# Patient Record
Sex: FEMALE | Race: WHITE | Employment: FULL TIME | URBAN - METROPOLITAN AREA
[De-identification: names, ages, dates, MRNs, and addresses within clinical notes are randomized per-mention and may not be internally consistent; named-entity substitution may affect disease eponyms.]

---

## 2018-05-28 ENCOUNTER — HOSPITAL ENCOUNTER (EMERGENCY)
Age: 33
Discharge: HOME | End: 2018-05-28
Payer: COMMERCIAL

## 2018-05-28 VITALS
DIASTOLIC BLOOD PRESSURE: 77 MMHG | OXYGEN SATURATION: 98 % | RESPIRATION RATE: 16 BRPM | HEART RATE: 75 BPM | BODY MASS INDEX: 33.32 KG/M2 | HEIGHT: 65 IN | WEIGHT: 200 LBS | TEMPERATURE: 98.5 F | SYSTOLIC BLOOD PRESSURE: 159 MMHG

## 2018-05-28 DIAGNOSIS — J03.90 ACUTE TONSILLITIS, UNSPECIFIED ETIOLOGY: Primary | ICD-10-CM

## 2018-05-28 LAB — STREP GRP A PCR: NEGATIVE

## 2018-05-28 PROCEDURE — 87880 STREP A ASSAY W/OPTIC: CPT

## 2018-05-28 PROCEDURE — 99283 EMERGENCY DEPT VISIT LOW MDM: CPT

## 2018-05-28 RX ORDER — AMOXICILLIN AND CLAVULANATE POTASSIUM 875; 125 MG/1; MG/1
1 TABLET, FILM COATED ORAL 2 TIMES DAILY
Qty: 20 TABLET | Refills: 0 | Status: SHIPPED | OUTPATIENT
Start: 2018-05-28 | End: 2018-06-07

## 2018-05-28 RX ORDER — METHYLPREDNISOLONE 4 MG/1
TABLET ORAL
Qty: 1 KIT | Refills: 0 | Status: SHIPPED | OUTPATIENT
Start: 2018-05-28 | End: 2018-06-03

## 2018-05-28 ASSESSMENT — PAIN DESCRIPTION - PAIN TYPE: TYPE: ACUTE PAIN

## 2018-05-28 ASSESSMENT — PAIN SCALES - GENERAL: PAINLEVEL_OUTOF10: 2

## 2018-05-28 ASSESSMENT — PAIN DESCRIPTION - LOCATION: LOCATION: THROAT

## 2018-05-28 ASSESSMENT — PAIN DESCRIPTION - DESCRIPTORS: DESCRIPTORS: SORE

## 2025-02-01 ENCOUNTER — OFFICE VISIT (OUTPATIENT)
Dept: FAMILY MEDICINE CLINIC | Age: 40
End: 2025-02-01
Payer: COMMERCIAL

## 2025-02-01 VITALS
BODY MASS INDEX: 38.49 KG/M2 | SYSTOLIC BLOOD PRESSURE: 112 MMHG | DIASTOLIC BLOOD PRESSURE: 82 MMHG | RESPIRATION RATE: 18 BRPM | HEART RATE: 93 BPM | TEMPERATURE: 98.4 F | OXYGEN SATURATION: 96 % | HEIGHT: 65 IN | WEIGHT: 231 LBS

## 2025-02-01 DIAGNOSIS — J06.9 VIRAL URI: ICD-10-CM

## 2025-02-01 DIAGNOSIS — J02.9 SORE THROAT: ICD-10-CM

## 2025-02-01 DIAGNOSIS — R52 BODY ACHES: ICD-10-CM

## 2025-02-01 DIAGNOSIS — R05.9 COUGH, UNSPECIFIED TYPE: Primary | ICD-10-CM

## 2025-02-01 DIAGNOSIS — R09.81 CONGESTION OF NASAL SINUS: ICD-10-CM

## 2025-02-01 LAB
INFLUENZA A ANTIGEN, POC: NORMAL
INFLUENZA B ANTIGEN, POC: NORMAL
Lab: NORMAL
PERFORMING INSTRUMENT: NORMAL
QC PASS/FAIL: NORMAL
S PYO AG THROAT QL: NORMAL
SARS-COV-2, POC: NORMAL

## 2025-02-01 PROCEDURE — 87426 SARSCOV CORONAVIRUS AG IA: CPT | Performed by: FAMILY MEDICINE

## 2025-02-01 PROCEDURE — 87804 INFLUENZA ASSAY W/OPTIC: CPT | Performed by: FAMILY MEDICINE

## 2025-02-01 PROCEDURE — 99213 OFFICE O/P EST LOW 20 MIN: CPT | Performed by: FAMILY MEDICINE

## 2025-02-01 PROCEDURE — 87880 STREP A ASSAY W/OPTIC: CPT | Performed by: FAMILY MEDICINE

## 2025-02-01 RX ORDER — BENZONATATE 100 MG/1
100 CAPSULE ORAL 3 TIMES DAILY PRN
Qty: 30 CAPSULE | Refills: 0 | Status: SHIPPED | OUTPATIENT
Start: 2025-02-01 | End: 2025-02-11

## 2025-02-01 RX ORDER — AZITHROMYCIN 250 MG/1
TABLET, FILM COATED ORAL
Qty: 6 TABLET | Refills: 0 | Status: SHIPPED | OUTPATIENT
Start: 2025-02-01 | End: 2025-02-11

## 2025-02-01 RX ORDER — GUAIFENESIN 600 MG/1
600 TABLET, EXTENDED RELEASE ORAL 2 TIMES DAILY
Qty: 30 TABLET | Refills: 0 | Status: SHIPPED | OUTPATIENT
Start: 2025-02-01 | End: 2025-02-16

## 2025-02-01 NOTE — PROGRESS NOTES
OFFICE NOTE    25  Name: Carrol Blas  :1985   Sex:female   Age:39 y.o.      SUBJECTIVE  Chief Complaint   Patient presents with    Cough    Congestion    Generalized Body Aches    Fatigue    Nasal Congestion    Sore Throat       HPI Symptomatic for several days    Review of Systems   Does not believe she has had a high fever, cough sometimes productive. Not overtly wheezing      Current Outpatient Medications:     azithromycin (ZITHROMAX) 250 MG tablet, 500mg on day 1 followed by 250mg on days 2 - 5, Disp: 6 tablet, Rfl: 0    benzonatate (TESSALON) 100 MG capsule, Take 1 capsule by mouth 3 times daily as needed for Cough, Disp: 30 capsule, Rfl: 0    guaiFENesin (MUCINEX) 600 MG extended release tablet, Take 1 tablet by mouth 2 times daily for 15 days, Disp: 30 tablet, Rfl: 0  No Known Allergies    No past medical history on file.  No past surgical history on file.  No family history on file.  Social History       Tobacco History       Smoking Status  Every Day Current Packs/Day  0.3 packs/day Smoking Tobacco Type  Cigarettes   Pack Year History     Packs/Day From To Years    0.25   0.0      Smokeless Tobacco Use  Never              Alcohol History       Alcohol Use Status  Yes Comment  Social              Drug Use       Drug Use Status  No              Sexual Activity       Sexually Active  Not Asked                    OBJECTIVE  Vitals:    25 0830   BP: 112/82   Site: Left Upper Arm   Position: Sitting   Cuff Size: Large Adult   Pulse: 93   Resp: 18   Temp: 98.4 °F (36.9 °C)   TempSrc: Temporal   SpO2: 96%   Weight: 104.8 kg (231 lb)   Height: 1.651 m (5' 5\")        Body mass index is 38.44 kg/m².    Orders Placed This Encounter   Procedures    POCT Influenza A/B Antigen    POCT rapid strep A    POCT COVID-19, Antigen        EXAM   Physical Exam  Vitals and nursing note reviewed.   Constitutional:       Appearance: Normal appearance. She is obese.   HENT:      Right Ear: Tympanic membrane

## 2025-04-18 ENCOUNTER — OFFICE VISIT (OUTPATIENT)
Dept: FAMILY MEDICINE CLINIC | Age: 40
End: 2025-04-18

## 2025-04-18 VITALS
DIASTOLIC BLOOD PRESSURE: 76 MMHG | WEIGHT: 238 LBS | RESPIRATION RATE: 18 BRPM | BODY MASS INDEX: 39.65 KG/M2 | HEIGHT: 65 IN | TEMPERATURE: 99.5 F | OXYGEN SATURATION: 97 % | HEART RATE: 110 BPM | SYSTOLIC BLOOD PRESSURE: 126 MMHG

## 2025-04-18 DIAGNOSIS — J02.9 SORE THROAT: ICD-10-CM

## 2025-04-18 DIAGNOSIS — J06.9 VIRAL URI: ICD-10-CM

## 2025-04-18 DIAGNOSIS — J02.9 ACUTE VIRAL PHARYNGITIS: Primary | ICD-10-CM

## 2025-04-18 LAB
INFLUENZA A ANTIBODY: NEGATIVE
INFLUENZA B ANTIBODY: NEGATIVE
Lab: NORMAL
PERFORMING INSTRUMENT: NORMAL
QC PASS/FAIL: NORMAL
S PYO AG THROAT QL: NORMAL
SARS-COV-2, POC: NORMAL

## 2025-04-18 RX ORDER — METHYLPREDNISOLONE 4 MG/1
TABLET ORAL
Qty: 1 KIT | Refills: 0 | Status: SHIPPED | OUTPATIENT
Start: 2025-04-19

## 2025-04-18 RX ORDER — METHYLPREDNISOLONE ACETATE 40 MG/ML
40 INJECTION, SUSPENSION INTRA-ARTICULAR; INTRALESIONAL; INTRAMUSCULAR; SOFT TISSUE ONCE
Status: COMPLETED | OUTPATIENT
Start: 2025-04-18 | End: 2025-04-18

## 2025-04-18 RX ADMIN — METHYLPREDNISOLONE ACETATE 40 MG: 40 INJECTION, SUSPENSION INTRA-ARTICULAR; INTRALESIONAL; INTRAMUSCULAR; SOFT TISSUE at 08:24

## 2025-04-18 NOTE — PROGRESS NOTES
Chief Complaint   Fever, Pharyngitis (Onset yesterday), and Ear Pain (Right ear)      [facility]   Source of history provided by: patient.      History of Present Illness  The patient presents for evaluation of a sore throat.    The onset of these symptoms was on Tuesday, initially presenting as allergies with significant drainage. Despite the resolution of the drainage following the administration of allergy medication, the sore throat persisted and escalated overnight. She also reports unilateral ear pain on the right side but does not have any associated cough. She has no history of asthma and reports no chest pain, shortness of breath, dizziness, or abdominal issues. She experienced a fever earlier, which was managed with Tylenol last night. She works at [employer], where she frequently interacts with sick individuals.   SOCIAL HISTORY  She admits to smoking.    MEDICATIONS  Current: Tylenol       ROS   Pertinent positives and negatives are stated within HPI, all other systems reviewed and are negative.  Past Medical History:  has no past medical history on file.  Surgical History:  has no past surgical history on file.  Social History:  reports that she has been smoking cigarettes. She has never used smokeless tobacco. She reports current alcohol use. She reports that she does not use drugs.  Family History: family history is not on file.  Allergies: Patient has no known allergies.    Physical Exam      VS:  /76   Pulse (!) 110   Temp 99.5 °F (37.5 °C) (Temporal)   Resp 18   Ht 1.651 m (5' 5\")   Wt 108 kg (238 lb)   SpO2 97%   BMI 39.61 kg/m²    Oxygen Saturation Interpretation: Normal.    Physical Exam  Constitutional:       Appearance: Normal appearance.   HENT:      Head: Normocephalic.      Right Ear: Ear canal and external ear normal. A middle ear effusion is present.      Left Ear: Ear canal and external ear normal. A middle ear effusion is present.      Nose: Congestion and

## 2025-06-24 LAB
CHOLEST SERPL-MCNC: 193 MG/DL
GLUCOSE SERPL-MCNC: 106 MG/DL (ref 74–99)
HDLC SERPL-MCNC: 38 MG/DL
LDLC SERPL CALC-MCNC: 123 MG/DL
TRIGL SERPL-MCNC: 159 MG/DL
VLDLC SERPL CALC-MCNC: 32 MG/DL

## 2025-07-20 ENCOUNTER — OFFICE VISIT (OUTPATIENT)
Dept: FAMILY MEDICINE CLINIC | Age: 40
End: 2025-07-20
Payer: COMMERCIAL

## 2025-07-20 VITALS
OXYGEN SATURATION: 97 % | SYSTOLIC BLOOD PRESSURE: 122 MMHG | RESPIRATION RATE: 20 BRPM | HEART RATE: 95 BPM | TEMPERATURE: 97.2 F | DIASTOLIC BLOOD PRESSURE: 84 MMHG

## 2025-07-20 DIAGNOSIS — B96.89 ACUTE BACTERIAL SINUSITIS: Primary | ICD-10-CM

## 2025-07-20 DIAGNOSIS — J01.90 ACUTE BACTERIAL SINUSITIS: Primary | ICD-10-CM

## 2025-07-20 PROCEDURE — 99213 OFFICE O/P EST LOW 20 MIN: CPT | Performed by: STUDENT IN AN ORGANIZED HEALTH CARE EDUCATION/TRAINING PROGRAM

## 2025-07-20 RX ORDER — METHYLPREDNISOLONE 4 MG/1
TABLET ORAL
Qty: 1 KIT | Refills: 0 | Status: SHIPPED | OUTPATIENT
Start: 2025-07-20 | End: 2025-07-20

## 2025-07-20 RX ORDER — DOXYCYCLINE HYCLATE 100 MG
100 TABLET ORAL 2 TIMES DAILY
Qty: 20 TABLET | Refills: 0 | Status: SHIPPED | OUTPATIENT
Start: 2025-07-20 | End: 2025-07-20

## 2025-07-20 RX ORDER — BENZONATATE 100 MG/1
100 CAPSULE ORAL 2 TIMES DAILY PRN
Qty: 20 CAPSULE | Refills: 0 | Status: SHIPPED | OUTPATIENT
Start: 2025-07-20 | End: 2025-07-20

## 2025-07-20 RX ORDER — DOXYCYCLINE HYCLATE 100 MG
100 TABLET ORAL 2 TIMES DAILY
Qty: 20 TABLET | Refills: 0 | Status: SHIPPED | OUTPATIENT
Start: 2025-07-20 | End: 2025-07-30

## 2025-07-20 RX ORDER — METHYLPREDNISOLONE 4 MG/1
TABLET ORAL
Qty: 1 KIT | Refills: 0 | Status: SHIPPED | OUTPATIENT
Start: 2025-07-20

## 2025-07-20 RX ORDER — BENZONATATE 100 MG/1
100 CAPSULE ORAL 2 TIMES DAILY PRN
Qty: 20 CAPSULE | Refills: 0 | Status: SHIPPED | OUTPATIENT
Start: 2025-07-20 | End: 2025-07-27